# Patient Record
Sex: MALE | Race: WHITE | Employment: UNEMPLOYED | URBAN - METROPOLITAN AREA
[De-identification: names, ages, dates, MRNs, and addresses within clinical notes are randomized per-mention and may not be internally consistent; named-entity substitution may affect disease eponyms.]

---

## 2022-08-19 ENCOUNTER — HOSPITAL ENCOUNTER (EMERGENCY)
Age: 47
Discharge: HOME | End: 2022-08-19
Attending: EMERGENCY MEDICINE
Payer: MEDICAID

## 2022-08-19 ENCOUNTER — APPOINTMENT (OUTPATIENT)
Dept: CT IMAGING | Age: 47
End: 2022-08-19
Payer: MEDICAID

## 2022-08-19 VITALS
HEIGHT: 75 IN | RESPIRATION RATE: 17 BRPM | OXYGEN SATURATION: 96 % | BODY MASS INDEX: 23.49 KG/M2 | WEIGHT: 188.9 LBS | SYSTOLIC BLOOD PRESSURE: 160 MMHG | DIASTOLIC BLOOD PRESSURE: 109 MMHG | TEMPERATURE: 98.2 F | HEART RATE: 99 BPM

## 2022-08-19 DIAGNOSIS — T50.901A ACCIDENTAL DRUG OVERDOSE, INITIAL ENCOUNTER: Primary | ICD-10-CM

## 2022-08-19 DIAGNOSIS — M62.82 SECONDARY RHABDOMYOLYSIS: ICD-10-CM

## 2022-08-19 LAB
A/G RATIO: 1.6 (ref 1.1–2.2)
ALBUMIN SERPL-MCNC: 4.2 G/DL (ref 3.4–5)
ALP BLD-CCNC: 74 U/L (ref 40–129)
ALT SERPL-CCNC: 98 U/L (ref 10–40)
ANION GAP SERPL CALCULATED.3IONS-SCNC: 12 MMOL/L (ref 3–16)
ANION GAP SERPL CALCULATED.3IONS-SCNC: 15 MMOL/L (ref 3–16)
AST SERPL-CCNC: 125 U/L (ref 15–37)
BASOPHILS ABSOLUTE: 0 K/UL (ref 0–0.2)
BASOPHILS RELATIVE PERCENT: 0.4 %
BILIRUB SERPL-MCNC: 0.8 MG/DL (ref 0–1)
BUN BLDV-MCNC: 11 MG/DL (ref 7–20)
BUN BLDV-MCNC: 9 MG/DL (ref 7–20)
CALCIUM SERPL-MCNC: 8.1 MG/DL (ref 8.3–10.6)
CALCIUM SERPL-MCNC: 9.1 MG/DL (ref 8.3–10.6)
CHLORIDE BLD-SCNC: 108 MMOL/L (ref 99–110)
CHLORIDE BLD-SCNC: 111 MMOL/L (ref 99–110)
CO2: 23 MMOL/L (ref 21–32)
CO2: 24 MMOL/L (ref 21–32)
CREAT SERPL-MCNC: 0.9 MG/DL (ref 0.9–1.3)
CREAT SERPL-MCNC: 1.1 MG/DL (ref 0.9–1.3)
EOSINOPHILS ABSOLUTE: 0.1 K/UL (ref 0–0.6)
EOSINOPHILS RELATIVE PERCENT: 0.6 %
ETHANOL: NORMAL MG/DL (ref 0–0.08)
GFR AFRICAN AMERICAN: >60
GFR AFRICAN AMERICAN: >60
GFR NON-AFRICAN AMERICAN: >60
GFR NON-AFRICAN AMERICAN: >60
GLUCOSE BLD-MCNC: 109 MG/DL (ref 70–99)
GLUCOSE BLD-MCNC: 127 MG/DL (ref 70–99)
HCT VFR BLD CALC: 41.9 % (ref 40.5–52.5)
HEMOGLOBIN: 14.5 G/DL (ref 13.5–17.5)
LACTIC ACID: 1.6 MMOL/L (ref 0.4–2)
LACTIC ACID: 2.1 MMOL/L (ref 0.4–2)
LYMPHOCYTES ABSOLUTE: 1.1 K/UL (ref 1–5.1)
LYMPHOCYTES RELATIVE PERCENT: 10.8 %
MCH RBC QN AUTO: 29.1 PG (ref 26–34)
MCHC RBC AUTO-ENTMCNC: 34.5 G/DL (ref 31–36)
MCV RBC AUTO: 84.4 FL (ref 80–100)
MONOCYTES ABSOLUTE: 0.9 K/UL (ref 0–1.3)
MONOCYTES RELATIVE PERCENT: 9.4 %
NEUTROPHILS ABSOLUTE: 7.8 K/UL (ref 1.7–7.7)
NEUTROPHILS RELATIVE PERCENT: 78.8 %
PDW BLD-RTO: 14.6 % (ref 12.4–15.4)
PLATELET # BLD: 322 K/UL (ref 135–450)
PMV BLD AUTO: 8.3 FL (ref 5–10.5)
POTASSIUM SERPL-SCNC: 3.4 MMOL/L (ref 3.5–5.1)
POTASSIUM SERPL-SCNC: 3.7 MMOL/L (ref 3.5–5.1)
RBC # BLD: 4.97 M/UL (ref 4.2–5.9)
SODIUM BLD-SCNC: 146 MMOL/L (ref 136–145)
SODIUM BLD-SCNC: 147 MMOL/L (ref 136–145)
TOTAL CK: 2110 U/L (ref 39–308)
TOTAL CK: 2553 U/L (ref 39–308)
TOTAL PROTEIN: 6.8 G/DL (ref 6.4–8.2)
TROPONIN: <0.01 NG/ML
WBC # BLD: 9.9 K/UL (ref 4–11)

## 2022-08-19 PROCEDURE — 6370000000 HC RX 637 (ALT 250 FOR IP): Performed by: EMERGENCY MEDICINE

## 2022-08-19 PROCEDURE — 85025 COMPLETE CBC W/AUTO DIFF WBC: CPT

## 2022-08-19 PROCEDURE — 96374 THER/PROPH/DIAG INJ IV PUSH: CPT

## 2022-08-19 PROCEDURE — 83605 ASSAY OF LACTIC ACID: CPT

## 2022-08-19 PROCEDURE — 96361 HYDRATE IV INFUSION ADD-ON: CPT

## 2022-08-19 PROCEDURE — 82550 ASSAY OF CK (CPK): CPT

## 2022-08-19 PROCEDURE — 93005 ELECTROCARDIOGRAM TRACING: CPT | Performed by: EMERGENCY MEDICINE

## 2022-08-19 PROCEDURE — 80053 COMPREHEN METABOLIC PANEL: CPT

## 2022-08-19 PROCEDURE — 70450 CT HEAD/BRAIN W/O DYE: CPT

## 2022-08-19 PROCEDURE — 36415 COLL VENOUS BLD VENIPUNCTURE: CPT

## 2022-08-19 PROCEDURE — 84484 ASSAY OF TROPONIN QUANT: CPT

## 2022-08-19 PROCEDURE — 99284 EMERGENCY DEPT VISIT MOD MDM: CPT

## 2022-08-19 PROCEDURE — 2580000003 HC RX 258: Performed by: EMERGENCY MEDICINE

## 2022-08-19 PROCEDURE — 82077 ASSAY SPEC XCP UR&BREATH IA: CPT

## 2022-08-19 PROCEDURE — 6360000002 HC RX W HCPCS: Performed by: EMERGENCY MEDICINE

## 2022-08-19 RX ORDER — LORAZEPAM 2 MG/ML
2 INJECTION INTRAMUSCULAR ONCE
Status: DISCONTINUED | OUTPATIENT
Start: 2022-08-19 | End: 2022-08-19

## 2022-08-19 RX ORDER — 0.9 % SODIUM CHLORIDE 0.9 %
1000 INTRAVENOUS SOLUTION INTRAVENOUS ONCE
Status: COMPLETED | OUTPATIENT
Start: 2022-08-19 | End: 2022-08-19

## 2022-08-19 RX ORDER — DIAZEPAM 5 MG/1
5 TABLET ORAL ONCE
Status: COMPLETED | OUTPATIENT
Start: 2022-08-19 | End: 2022-08-19

## 2022-08-19 RX ORDER — DIAZEPAM 5 MG/ML
5 INJECTION, SOLUTION INTRAMUSCULAR; INTRAVENOUS ONCE
Status: DISCONTINUED | OUTPATIENT
Start: 2022-08-19 | End: 2022-08-19 | Stop reason: RX

## 2022-08-19 RX ORDER — MIDAZOLAM HYDROCHLORIDE 1 MG/ML
2 INJECTION INTRAMUSCULAR; INTRAVENOUS ONCE
Status: COMPLETED | OUTPATIENT
Start: 2022-08-19 | End: 2022-08-19

## 2022-08-19 RX ADMIN — SODIUM CHLORIDE 1000 ML: 9 INJECTION, SOLUTION INTRAVENOUS at 18:01

## 2022-08-19 RX ADMIN — MIDAZOLAM 2 MG: 1 INJECTION INTRAMUSCULAR; INTRAVENOUS at 19:33

## 2022-08-19 RX ADMIN — SODIUM CHLORIDE 1000 ML: 9 INJECTION, SOLUTION INTRAVENOUS at 16:35

## 2022-08-19 RX ADMIN — DIAZEPAM 5 MG: 5 TABLET ORAL at 18:02

## 2022-08-19 ASSESSMENT — PAIN - FUNCTIONAL ASSESSMENT: PAIN_FUNCTIONAL_ASSESSMENT: NONE - DENIES PAIN

## 2022-08-19 NOTE — ED NOTES
Pt resting in bed with water and urinal at bedside. Call light in reach. No wants or needs at this time.       Ronda De La Torre RN  08/19/22 1269

## 2022-08-19 NOTE — ED PROVIDER NOTES
CHIEF COMPLAINT  Chief Complaint   Patient presents with    Drug Overdose       HISTORY OF PRESENT ILLNESS  Clementina Banegas is a 42 y.o. male who presents to the ED complaining of being at work when he reports that he used methamphetamines by snorting and then according to bystanders, became confused with acting \"bizarrely\" and having stiff muscles. Patient denies alcohol or opiate use. Patient denies any pain complaints. Patient does answer questions but history is unreliable. No other complaints, modifying factors or associated symptoms. Nursing notes reviewed. History reviewed. No pertinent past medical history. History reviewed. No pertinent surgical history. History reviewed. No pertinent family history.   Social History     Socioeconomic History    Marital status: Single     Spouse name: Not on file    Number of children: Not on file    Years of education: Not on file    Highest education level: Not on file   Occupational History    Not on file   Tobacco Use    Smoking status: Every Day     Packs/day: 0.50     Years: 10.00     Pack years: 5.00     Types: Cigarettes    Smokeless tobacco: Not on file   Substance and Sexual Activity    Alcohol use: Yes     Comment: occ    Drug use: No    Sexual activity: Not on file   Other Topics Concern    Not on file   Social History Narrative    Not on file     Social Determinants of Health     Financial Resource Strain: Not on file   Food Insecurity: Not on file   Transportation Needs: Not on file   Physical Activity: Not on file   Stress: Not on file   Social Connections: Not on file   Intimate Partner Violence: Not on file   Housing Stability: Not on file     Current Facility-Administered Medications   Medication Dose Route Frequency Provider Last Rate Last Admin    0.9 % sodium chloride bolus  1,000 mL IntraVENous Once Sterling Jurado MD        diazePAM (VALIUM) tablet 5 mg  5 mg Oral Once Sterling Jurado MD         No current outpatient medications on file. Allergies   Allergen Reactions    Ibuprofen Hives       REVIEW OF SYSTEMS  Positives and pertinent negatives as per HPI. Ten other systems were reviewed and are negative. Nursing notes pertaining to ROS were reviewed. PHYSICAL EXAM   BP (!) 159/96   Pulse (!) 117   Temp 99.6 °F (37.6 °C) (Oral)   Resp 15   SpO2 99%   General: Awake, oriented to self and day but not place or time. No increased work of breathing or accessory muscle use. Non-ill appearing. Diaphoretic. Intermittent diffuse muscle twitching and spasms. Eyes: PERRL with intermittent left exotropia/disconjugate gaze, no scleral icterus, injection or exudate. HENT: Atraumatic. Oral pharynx is clear, moist, no enanthem. No tonsillar hypertropy or exudate. Nasal mucous membranes are clear. TM's are clear without evidence of otitis media. No hemotympanums. Neck:  No Lymphadenopathy. Non-tender to palpation. Normal ROM. No JVD. No thyromegaly. No Mass. PULMONARY: Lungs clear bilaterally without wheezes, rales or rhonchi. Good air movement bilaterally. CV: Regular rate and rhythm without murmurs, rubs or gallops. ABD: Soft, non-tender, non-distended, normal bowel sounds, no hepatosplenomegaly, no masses. No peritoneal signs, rebound or guarding. Back:  No CVAT, no rash. EXT: No cyanosis or clubbing. No rash. CR < 2 seconds. No tenderness to palpation. No lower extremity edema. +2 pulses in upper/lower extremities bilaterally. Skin is warm and dry. PSYCH: normal affect  NEURO:  GCS 14.  CN 2-12 are intact. 5 of 5 LE strength that is bilaterally symmetric.  5 of 5 UE strength that is bilaterally symmetric. Normal sensation to light touch of the UE and LE.  2/4 DTR of the biceps, patellar and Achilles tendons. No clonus. Normal Romberg without pronator drift.       12 LEAD EKG AS INTERPRETED BY ME:  Sinus Tachy  RATE   NORMAL AXIS   LAE  NO ST-T SIGNS OF ACUTE ISCHEMIA OR INFARCT RADIOLOGY    CT Head W/O Contrast   Final Result   No acute intracranial abnormality. LABS  I have reviewed all labs for this visit.    Results for orders placed or performed during the hospital encounter of 08/19/22   CBC with Auto Differential   Result Value Ref Range    WBC 9.9 4.0 - 11.0 K/uL    RBC 4.97 4.20 - 5.90 M/uL    Hemoglobin 14.5 13.5 - 17.5 g/dL    Hematocrit 41.9 40.5 - 52.5 %    MCV 84.4 80.0 - 100.0 fL    MCH 29.1 26.0 - 34.0 pg    MCHC 34.5 31.0 - 36.0 g/dL    RDW 14.6 12.4 - 15.4 %    Platelets 149 465 - 324 K/uL    MPV 8.3 5.0 - 10.5 fL    Neutrophils % 78.8 %    Lymphocytes % 10.8 %    Monocytes % 9.4 %    Eosinophils % 0.6 %    Basophils % 0.4 %    Neutrophils Absolute 7.8 (H) 1.7 - 7.7 K/uL    Lymphocytes Absolute 1.1 1.0 - 5.1 K/uL    Monocytes Absolute 0.9 0.0 - 1.3 K/uL    Eosinophils Absolute 0.1 0.0 - 0.6 K/uL    Basophils Absolute 0.0 0.0 - 0.2 K/uL   CMP   Result Value Ref Range    Sodium 147 (H) 136 - 145 mmol/L    Potassium 3.4 (L) 3.5 - 5.1 mmol/L    Chloride 108 99 - 110 mmol/L    CO2 24 21 - 32 mmol/L    Anion Gap 15 3 - 16    Glucose 127 (H) 70 - 99 mg/dL    BUN 11 7 - 20 mg/dL    Creatinine 1.1 0.9 - 1.3 mg/dL    GFR Non-African American >60 >60    GFR African American >60 >60    Calcium 9.1 8.3 - 10.6 mg/dL    Total Protein 6.8 6.4 - 8.2 g/dL    Albumin 4.2 3.4 - 5.0 g/dL    Albumin/Globulin Ratio 1.6 1.1 - 2.2    Total Bilirubin 0.8 0.0 - 1.0 mg/dL    Alkaline Phosphatase 74 40 - 129 U/L    ALT 98 (H) 10 - 40 U/L     (H) 15 - 37 U/L   Lactic Acid   Result Value Ref Range    Lactic Acid 2.1 (H) 0.4 - 2.0 mmol/L   Troponin   Result Value Ref Range    Troponin <0.01 <0.01 ng/mL   Ethanol   Result Value Ref Range    Ethanol Lvl None Detected mg/dL   CK   Result Value Ref Range    Total CK 2,553 (H) 39 - 308 U/L           ED COURSE/MDM  Methamphetamine overdose with secondary rhabdomyolysis: Patient was given IV fluids and PO benzodiazepines in the emergency department. Patient's presentation is consistent with a methamphetamine overdose and patient has no evidence of neurologic deficits with a CAT scan of the head reveals no acute ischemia or bleeding. EKG reveals sinus tachycardia without other acute changes with a normal troponin. Patient's lactic acid was mildly elevated likely secondary to demand and no other evidence of focal bacterial illness was identified. Patient has normal CBC with no leukocytosis. Patient's electrolytes were also unremarkable but an elevated CPK with evidence of mild rhabdomyolysis. Patient will be signed over to Dr. Albert Smith at 98 for continued emergency room observation, repeat CK/BMP and lactic and if improving and the patient is neurologically competent enough he could be discharged with close follow up or he may need admission if symptoms are persistent and/or CPK continues to increase despite IVF in the ED. Patient was given scripts for the following medications. I counseled patient how to take these medications. New Prescriptions    No medications on file         CLINICAL IMPRESSION  1. Accidental drug overdose, initial encounter    2. Secondary rhabdomyolysis        Blood pressure (!) 159/96, pulse (!) 117, temperature 99.6 °F (37.6 °C), temperature source Oral, resp. rate 15, SpO2 99 %. Follow-up with:  No follow-up provider specified.         Kriss Brownlee MD  08/19/22 1340

## 2022-08-19 NOTE — ED PROVIDER NOTES
I received this patient in signout from Dr. Kris Hylton. We discussed the patient's initial history, physical, workup thus far, and medical decision making to this point. Kendy Jaramillo MD, am the primary clinician of record. Briefly, Ying Lujan is a 42 y.o. male  who presents to the ED complaining of methamphetamine intoxication with confusion and bizarre behavior with myalgias. Initial history/exam also pertinent for no other illicit substance use per patient report who has been forthcoming. He is tachycardic with a CK elevation, mild lactic acidosis. Pending studies at time of signout include: Repeat labs and vitals after fluids, urine drug screen    The patient will be reassessed after workup above is completed. Anticipated disposition at time of signout is undetermined, potentially discharge if heart rate and CK improve from his methamphetamine use or admit if not. ED COURSE/MDM  I introduced myself to the patient and performed my initial assessment on Ying Lujan. There is no significant change in the patient's history from what is documented initially by Dr. Kris Hylton  after my evaluation. Old records reviewed. Labs and imaging reviewed. On my physical examination of the patient, his HR has normalized, his CK is still elevated but downtrending and pt is tolerating PO. Hydration encouraged. Lactate also normalized and creatinine is fine as well. Since time of sign out, the patient's ED workup was notable for A&Ox4 and shared MDM to discharge the patient with new PCP referral, encouraged hydration, discouraged meth use, and RTED if new or worsening sx develop.     During the patient's ED course, the patient was given:  Medications   0.9 % sodium chloride bolus (0 mLs IntraVENous Stopped 8/19/22 1741)   0.9 % sodium chloride bolus (0 mLs IntraVENous Stopped 8/19/22 1853)   diazePAM (VALIUM) tablet 5 mg (5 mg Oral Given 8/19/22 1802)   midazolam (VERSED) injection 2 mg (2

## 2022-08-19 NOTE — ED TRIAGE NOTES
Pt to ED per friend through triage after patient took some meth and was acting erratic in the street. Per friend, pt does have hx of drug overdoses. Pt is currently acting erratic in triage. Taken back to room via wheelchair. Pt diaphoretic.

## 2022-08-20 LAB
EKG ATRIAL RATE: 122 BPM
EKG DIAGNOSIS: NORMAL
EKG P AXIS: 64 DEGREES
EKG P-R INTERVAL: 132 MS
EKG Q-T INTERVAL: 342 MS
EKG QRS DURATION: 94 MS
EKG QTC CALCULATION (BAZETT): 487 MS
EKG R AXIS: 72 DEGREES
EKG T AXIS: 47 DEGREES
EKG VENTRICULAR RATE: 122 BPM

## 2022-08-20 PROCEDURE — 93010 ELECTROCARDIOGRAM REPORT: CPT | Performed by: INTERNAL MEDICINE

## 2022-08-20 NOTE — ED NOTES
Pt alert. Speaking on phone with friends to find a ride home.       [facility]  08/19/22 2045

## 2022-08-20 NOTE — ED NOTES
Discharge and education instructions reviewed. Patient verbalized understanding, teach-back successful. Patient denied questions at this time. No acute distress noted. Patient instructed to follow-up as noted - return to emergency department if symptoms worsen. Patient verbalized understanding. Discharged per EDMD with discharge instructions.         [address] [place] [address]  08/19/22 4657

## 2022-08-20 NOTE — ED NOTES
Resting in bed with eyes closed. RR 14 and even. NSR on monitor.       [id number] [facility], RN  08/19/22 2015

## 2022-08-20 NOTE — ED NOTES
Ambulated inddependently with steady gait to and from restroom.       [address] [facility], [address] [place] [address]  08/19/22 0430